# Patient Record
Sex: MALE | Race: WHITE
[De-identification: names, ages, dates, MRNs, and addresses within clinical notes are randomized per-mention and may not be internally consistent; named-entity substitution may affect disease eponyms.]

---

## 2022-09-15 PROBLEM — Z00.00 ENCOUNTER FOR PREVENTIVE HEALTH EXAMINATION: Status: ACTIVE | Noted: 2022-09-15

## 2022-09-30 ENCOUNTER — NON-APPOINTMENT (OUTPATIENT)
Age: 67
End: 2022-09-30

## 2022-09-30 ENCOUNTER — LABORATORY RESULT (OUTPATIENT)
Age: 67
End: 2022-09-30

## 2022-09-30 ENCOUNTER — APPOINTMENT (OUTPATIENT)
Age: 67
End: 2022-09-30

## 2022-09-30 VITALS
WEIGHT: 292 LBS | HEART RATE: 97 BPM | BODY MASS INDEX: 44.25 KG/M2 | OXYGEN SATURATION: 97 % | HEIGHT: 68 IN | RESPIRATION RATE: 14 BRPM

## 2022-09-30 DIAGNOSIS — Z86.39 PERSONAL HISTORY OF OTHER ENDOCRINE, NUTRITIONAL AND METABOLIC DISEASE: ICD-10-CM

## 2022-09-30 DIAGNOSIS — Z86.79 PERSONAL HISTORY OF OTHER DISEASES OF THE CIRCULATORY SYSTEM: ICD-10-CM

## 2022-09-30 DIAGNOSIS — Z87.891 PERSONAL HISTORY OF NICOTINE DEPENDENCE: ICD-10-CM

## 2022-09-30 PROCEDURE — 99204 OFFICE O/P NEW MOD 45 MIN: CPT

## 2022-09-30 RX ORDER — FLUTICASONE PROPIONATE 50 UG/1
50 SPRAY, METERED NASAL TWICE DAILY
Qty: 1 | Refills: 5 | Status: ACTIVE | COMMUNITY
Start: 2022-09-30 | End: 1900-01-01

## 2022-09-30 NOTE — DISCUSSION/SUMMARY
[FreeTextEntry1] : RECURRENT BRONCHITIS ( COUGH/ WHEEZING/ SOB), CATS ALLERGIES, OCC SMOKER\par ASTHMA\par RAST/ CBC\par PFT/ CHEST CT\par MONTELUKAST/ SYMBICORT\par \par PND FLUTICASONE\par \par HIGHLY ALE/ WEIGHT LOSS

## 2022-09-30 NOTE — HISTORY OF PRESENT ILLNESS
[Initial Eval - Existing Diagnosis] : an initial evaluation of an existing diagnosis of [Wheezing] : wheezing [Dyspnea on Exertion] : dyspnea on exertion [Productive Cough] : productive cough [Currently Experiencing] : The patient is currently experiencing symptoms. [None] : The patient is not currently being treated for this problem [Asthma Hospitalization] : no hospitalization for asthma [Intubation] : no intubation [Smoking] : smoking [Allergic Rhinitis] : allergic rhinitis [GERD] : gastroesophageal reflux [Asthma] : no asthma [Unlimited ADLs] : able to do activities of daily living without limitations [TextBox_4] : REPORTS ALMOST YEARLY BRONCHITIS, THIS MAY COUGHT, WHEEZING, SAW PMD STEROIDS/ ABX, BETTER FEW DAYS LATER SAME SYMPTOMS, WAS GIVEN SYMBICORT, USED TO BE ON SINUGULAIR STOPPED 2 Y AGO, SAW PUL BEFORE ASTHMA/ COPD\par GAINED 50 LB IN 12 YEARS, ALE SYMPTOMS\par REPORTS PND

## 2022-09-30 NOTE — PHYSICAL EXAM
[No Acute Distress] : no acute distress [IV] : Mallampati Class: IV [Normal Appearance] : normal appearance [No Neck Mass] : no neck mass [Normal Rate/Rhythm] : normal rate/rhythm [Normal S1, S2] : normal s1, s2 [No Murmurs] : no murmurs [No Abnormalities] : no abnormalities [Benign] : benign [Normal Gait] : normal gait [No Clubbing] : no clubbing [No Cyanosis] : no cyanosis [No Edema] : no edema [FROM] : FROM [Normal Color/ Pigmentation] : normal color/ pigmentation [No Focal Deficits] : no focal deficits [Oriented x3] : oriented x3 [Normal Affect] : normal affect [TextBox_68] : EXP WHEEZING

## 2022-10-12 RX ORDER — PREDNISONE 20 MG/1
20 TABLET ORAL
Qty: 15 | Refills: 0 | Status: ACTIVE | COMMUNITY
Start: 2022-10-12 | End: 1900-01-01

## 2022-10-12 RX ORDER — BUDESONIDE AND FORMOTEROL FUMARATE DIHYDRATE 160; 4.5 UG/1; UG/1
160-4.5 AEROSOL RESPIRATORY (INHALATION) TWICE DAILY
Qty: 1 | Refills: 3 | Status: ACTIVE | COMMUNITY
Start: 2022-10-12 | End: 1900-01-01

## 2022-11-01 ENCOUNTER — APPOINTMENT (OUTPATIENT)
Age: 67
End: 2022-11-01

## 2022-11-01 DIAGNOSIS — G47.33 OBSTRUCTIVE SLEEP APNEA (ADULT) (PEDIATRIC): ICD-10-CM

## 2022-11-01 DIAGNOSIS — J45.909 UNSPECIFIED ASTHMA, UNCOMPLICATED: ICD-10-CM

## 2022-11-01 DIAGNOSIS — R09.82 POSTNASAL DRIP: ICD-10-CM

## 2022-11-01 PROCEDURE — 99442: CPT | Mod: 95

## 2022-11-11 RX ORDER — DOXYCYCLINE HYCLATE 100 MG/1
100 CAPSULE ORAL TWICE DAILY
Qty: 14 | Refills: 0 | Status: ACTIVE | COMMUNITY
Start: 2022-11-11 | End: 1900-01-01

## 2022-11-30 ENCOUNTER — APPOINTMENT (OUTPATIENT)
Age: 67
End: 2022-11-30

## 2023-01-04 ENCOUNTER — APPOINTMENT (OUTPATIENT)
Age: 68
End: 2023-01-04

## 2023-01-05 ENCOUNTER — RX RENEWAL (OUTPATIENT)
Age: 68
End: 2023-01-05

## 2023-01-05 RX ORDER — MONTELUKAST 10 MG/1
10 TABLET, FILM COATED ORAL
Qty: 90 | Refills: 1 | Status: ACTIVE | COMMUNITY
Start: 2022-09-30 | End: 1900-01-01

## 2023-03-24 ENCOUNTER — INPATIENT (INPATIENT)
Facility: HOSPITAL | Age: 68
LOS: 2 days | Discharge: HOME | DRG: 313 | End: 2023-03-27
Attending: INTERNAL MEDICINE | Admitting: INTERNAL MEDICINE
Payer: MEDICARE

## 2023-03-24 VITALS
OXYGEN SATURATION: 91 % | DIASTOLIC BLOOD PRESSURE: 74 MMHG | RESPIRATION RATE: 18 BRPM | SYSTOLIC BLOOD PRESSURE: 136 MMHG | HEART RATE: 101 BPM | TEMPERATURE: 96 F

## 2023-03-24 DIAGNOSIS — R07.9 CHEST PAIN, UNSPECIFIED: ICD-10-CM

## 2023-03-24 LAB
ALBUMIN SERPL ELPH-MCNC: 4.5 G/DL — SIGNIFICANT CHANGE UP (ref 3.5–5.2)
ALP SERPL-CCNC: 100 U/L — SIGNIFICANT CHANGE UP (ref 30–115)
ALT FLD-CCNC: 24 U/L — SIGNIFICANT CHANGE UP (ref 0–41)
ANION GAP SERPL CALC-SCNC: 13 MMOL/L — SIGNIFICANT CHANGE UP (ref 7–14)
AST SERPL-CCNC: 34 U/L — SIGNIFICANT CHANGE UP (ref 0–41)
BASOPHILS # BLD AUTO: 0.04 K/UL — SIGNIFICANT CHANGE UP (ref 0–0.2)
BASOPHILS NFR BLD AUTO: 0.3 % — SIGNIFICANT CHANGE UP (ref 0–1)
BILIRUB SERPL-MCNC: 0.4 MG/DL — SIGNIFICANT CHANGE UP (ref 0.2–1.2)
BUN SERPL-MCNC: 16 MG/DL — SIGNIFICANT CHANGE UP (ref 10–20)
CALCIUM SERPL-MCNC: 9.5 MG/DL — SIGNIFICANT CHANGE UP (ref 8.4–10.4)
CHLORIDE SERPL-SCNC: 98 MMOL/L — SIGNIFICANT CHANGE UP (ref 98–110)
CO2 SERPL-SCNC: 28 MMOL/L — SIGNIFICANT CHANGE UP (ref 17–32)
CREAT SERPL-MCNC: 0.8 MG/DL — SIGNIFICANT CHANGE UP (ref 0.7–1.5)
D DIMER BLD IA.RAPID-MCNC: 473 NG/ML DDU — HIGH
EGFR: 96 ML/MIN/1.73M2 — SIGNIFICANT CHANGE UP
EOSINOPHIL # BLD AUTO: 0.07 K/UL — SIGNIFICANT CHANGE UP (ref 0–0.7)
EOSINOPHIL NFR BLD AUTO: 0.5 % — SIGNIFICANT CHANGE UP (ref 0–8)
GLUCOSE SERPL-MCNC: 118 MG/DL — HIGH (ref 70–99)
HCT VFR BLD CALC: 45.1 % — SIGNIFICANT CHANGE UP (ref 42–52)
HGB BLD-MCNC: 14.7 G/DL — SIGNIFICANT CHANGE UP (ref 14–18)
IMM GRANULOCYTES NFR BLD AUTO: 0.3 % — SIGNIFICANT CHANGE UP (ref 0.1–0.3)
LYMPHOCYTES # BLD AUTO: 0.94 K/UL — LOW (ref 1.2–3.4)
LYMPHOCYTES # BLD AUTO: 6.5 % — LOW (ref 20.5–51.1)
MCHC RBC-ENTMCNC: 32.3 PG — HIGH (ref 27–31)
MCHC RBC-ENTMCNC: 32.6 G/DL — SIGNIFICANT CHANGE UP (ref 32–37)
MCV RBC AUTO: 99.1 FL — HIGH (ref 80–94)
MONOCYTES # BLD AUTO: 0.9 K/UL — HIGH (ref 0.1–0.6)
MONOCYTES NFR BLD AUTO: 6.2 % — SIGNIFICANT CHANGE UP (ref 1.7–9.3)
NEUTROPHILS # BLD AUTO: 12.47 K/UL — HIGH (ref 1.4–6.5)
NEUTROPHILS NFR BLD AUTO: 86.2 % — HIGH (ref 42.2–75.2)
NRBC # BLD: 0 /100 WBCS — SIGNIFICANT CHANGE UP (ref 0–0)
NT-PROBNP SERPL-SCNC: 12 PG/ML — SIGNIFICANT CHANGE UP (ref 0–300)
PLATELET # BLD AUTO: 317 K/UL — SIGNIFICANT CHANGE UP (ref 130–400)
POTASSIUM SERPL-MCNC: 5 MMOL/L — SIGNIFICANT CHANGE UP (ref 3.5–5)
POTASSIUM SERPL-SCNC: 5 MMOL/L — SIGNIFICANT CHANGE UP (ref 3.5–5)
PROT SERPL-MCNC: 7 G/DL — SIGNIFICANT CHANGE UP (ref 6–8)
RBC # BLD: 4.55 M/UL — LOW (ref 4.7–6.1)
RBC # FLD: 13.1 % — SIGNIFICANT CHANGE UP (ref 11.5–14.5)
SODIUM SERPL-SCNC: 139 MMOL/L — SIGNIFICANT CHANGE UP (ref 135–146)
TROPONIN T SERPL-MCNC: <0.01 NG/ML — SIGNIFICANT CHANGE UP
WBC # BLD: 14.46 K/UL — HIGH (ref 4.8–10.8)
WBC # FLD AUTO: 14.46 K/UL — HIGH (ref 4.8–10.8)

## 2023-03-24 PROCEDURE — 87040 BLOOD CULTURE FOR BACTERIA: CPT

## 2023-03-24 PROCEDURE — 71045 X-RAY EXAM CHEST 1 VIEW: CPT | Mod: 26

## 2023-03-24 PROCEDURE — 86803 HEPATITIS C AB TEST: CPT

## 2023-03-24 PROCEDURE — 93010 ELECTROCARDIOGRAM REPORT: CPT | Mod: 76

## 2023-03-24 PROCEDURE — 36415 COLL VENOUS BLD VENIPUNCTURE: CPT

## 2023-03-24 PROCEDURE — 80048 BASIC METABOLIC PNL TOTAL CA: CPT

## 2023-03-24 PROCEDURE — 93005 ELECTROCARDIOGRAM TRACING: CPT

## 2023-03-24 PROCEDURE — 84443 ASSAY THYROID STIM HORMONE: CPT

## 2023-03-24 PROCEDURE — 94640 AIRWAY INHALATION TREATMENT: CPT

## 2023-03-24 PROCEDURE — 82962 GLUCOSE BLOOD TEST: CPT

## 2023-03-24 PROCEDURE — 78452 HT MUSCLE IMAGE SPECT MULT: CPT

## 2023-03-24 PROCEDURE — 83735 ASSAY OF MAGNESIUM: CPT

## 2023-03-24 PROCEDURE — 93306 TTE W/DOPPLER COMPLETE: CPT

## 2023-03-24 PROCEDURE — 84145 PROCALCITONIN (PCT): CPT

## 2023-03-24 PROCEDURE — 71275 CT ANGIOGRAPHY CHEST: CPT | Mod: 26,MA

## 2023-03-24 PROCEDURE — 83036 HEMOGLOBIN GLYCOSYLATED A1C: CPT

## 2023-03-24 PROCEDURE — 83615 LACTATE (LD) (LDH) ENZYME: CPT

## 2023-03-24 PROCEDURE — 85025 COMPLETE CBC W/AUTO DIFF WBC: CPT

## 2023-03-24 PROCEDURE — 93017 CV STRESS TEST TRACING ONLY: CPT

## 2023-03-24 PROCEDURE — 84484 ASSAY OF TROPONIN QUANT: CPT

## 2023-03-24 PROCEDURE — A9500: CPT

## 2023-03-24 PROCEDURE — 71045 X-RAY EXAM CHEST 1 VIEW: CPT

## 2023-03-24 PROCEDURE — 85027 COMPLETE CBC AUTOMATED: CPT

## 2023-03-24 PROCEDURE — 84100 ASSAY OF PHOSPHORUS: CPT

## 2023-03-24 PROCEDURE — 86140 C-REACTIVE PROTEIN: CPT

## 2023-03-24 PROCEDURE — 80053 COMPREHEN METABOLIC PANEL: CPT

## 2023-03-24 PROCEDURE — 85652 RBC SED RATE AUTOMATED: CPT

## 2023-03-24 PROCEDURE — 86618 LYME DISEASE ANTIBODY: CPT

## 2023-03-24 PROCEDURE — 80061 LIPID PANEL: CPT

## 2023-03-24 PROCEDURE — 99285 EMERGENCY DEPT VISIT HI MDM: CPT | Mod: FS

## 2023-03-24 RX ORDER — MORPHINE SULFATE 50 MG/1
4 CAPSULE, EXTENDED RELEASE ORAL ONCE
Refills: 0 | Status: DISCONTINUED | OUTPATIENT
Start: 2023-03-24 | End: 2023-03-24

## 2023-03-24 RX ADMIN — MORPHINE SULFATE 4 MILLIGRAM(S): 50 CAPSULE, EXTENDED RELEASE ORAL at 16:00

## 2023-03-24 NOTE — ED PROVIDER NOTE - ATTENDING APP SHARED VISIT CONTRIBUTION OF CARE
67-year-old male with a past medical history of jesus, asthma, bronchitis, hypertension presents with chest discomfort started this morning.  Middle of the chest.  Worse when he takes a deep breath.  Also associated shortness of breath and dyspnea on exertion over the past few days.  No fever or cough.  Denies new lower extremity swelling.  No history of DVT or PE.  On exam nontoxic, normal work of breathing, faint decreased breath sounds right base, no wheezes or crackles.  No rhonchi.  Sats 91% on room air, 97 on 4 L.  New oxygen requirement.  Heart regular no murmurs, abdomen benign, trace symmetric lower extremity pitting edema.  Differential diagnosis includes pneumonia, PE, heart failure, ACS.  Plan for chest x-ray, labs, D-dimer, troponin, supplemental oxygen, anticipate admission

## 2023-03-24 NOTE — ED PROVIDER NOTE - NS ED ATTENDING STATEMENT MOD
This was a shared visit with the CHERRI. I reviewed and verified the documentation and independently performed the documented:

## 2023-03-24 NOTE — ED ADULT NURSE NOTE - NSIMPLEMENTINTERV_GEN_ALL_ED
Implemented All Universal Safety Interventions:  [place] to call system. Call bell, personal items and telephone within reach. Instruct patient to call for assistance. Room bathroom lighting operational. Non-slip footwear when patient is off stretcher. Physically safe environment: no spills, clutter or unnecessary equipment. Stretcher in lowest position, wheels locked, appropriate side rails in place.

## 2023-03-24 NOTE — ED PROVIDER NOTE - PHYSICAL EXAMINATION
Physical Exam    Constitutional: No acute distress.   Eyes: Conjunctiva pink, Sclera clear, PERRLA, EOMI.  ENT: No sinus tenderness. No nasal discharge. No oropharyngeal erythema, edema, or exudates. Uvula midline.   Cardiovascular: Regular rate, regular rhythm. No noted murmurs rubs or gallops.  Respiratory: unlabored respiratory effort, decreased breath sounds right lower lobe   Gastrointestinal: Normal bowel sounds. soft, non distended, non-tender abdomen.   Musculoskeletal: supple neck, no midline tenderness. No joint or bony deformity.   Integumentary: warm, dry, no rash  Neurologic: awake, alert, cranial nerves II-XII grossly intact, extremities’ motor and sensory functions grossly intact  Psychiatric: appropriate mood, appropriate affect

## 2023-03-24 NOTE — ED PROVIDER NOTE - INTERPRETATION
Normal sinus rhythm, rate 100, normal intervals, normal axis, nonspecific T wave abnormalities, no ST depression or elevations, occasional PACs

## 2023-03-24 NOTE — ED PROVIDER NOTE - PROGRESS NOTE DETAILS
EP: Patient endorsed  to me by Dr. Cornelius to follow-up CTA chest without PE and admit.  CTA is negative for acute PE.

## 2023-03-24 NOTE — ED PROVIDER NOTE - OBJECTIVE STATEMENT
67-year-old male with past medical history of asthma, chronic bronchitis, ALE on CPAP, HTN, remote history of pericarditis presents the ED with chest pain and shortness of breath that started suddenly this morning at 8 AM.  Patient was initially experiencing right-sided chest pain but now describes substernal chest pain that is squeezing and constant.  He was given nitroglycerin x2 by EMS and took 4 baby aspirin's at home but remains with the pain.  Shortness of breath is present at rest and on exertion, O2 saturation on presentation was 91% room air.  Denies fever, chills, cough, abdominal pain, nausea, vomiting, diarrhea, dysuria, leg swelling.  No history of blood clots, no recent surgeries, no recent travel, no hormone use.

## 2023-03-24 NOTE — ED PROVIDER NOTE - CARE PLAN
1 Principal Discharge DX:	Chest pain  Secondary Diagnosis:	Shortness of breath  Secondary Diagnosis:	Hypoxia

## 2023-03-24 NOTE — ED ADULT NURSE NOTE - OBJECTIVE STATEMENT
pt A&Ox3, pt presents with persistent chest pain started this morning.   pt breathing with ease in 4l nasal canula.
Detail Level: Detailed

## 2023-03-24 NOTE — ED PROVIDER NOTE - CLINICAL SUMMARY MEDICAL DECISION MAKING FREE TEXT BOX
67-year-old male with ALE, hypertension, asthma presenting to ED for evaluation of chest pain and shortness of breath since this morning.  Labs ordered and reviewed, patient was found to have an elevated white cell count, CTA chest ordered to rule out PE, no PE found.  Given increased requirement for oxygen, will admit to the hospital for further evaluation and care.

## 2023-03-25 DIAGNOSIS — Z96.652 PRESENCE OF LEFT ARTIFICIAL KNEE JOINT: Chronic | ICD-10-CM

## 2023-03-25 DIAGNOSIS — Z98.49 CATARACT EXTRACTION STATUS, UNSPECIFIED EYE: Chronic | ICD-10-CM

## 2023-03-25 LAB
ANION GAP SERPL CALC-SCNC: 11 MMOL/L — SIGNIFICANT CHANGE UP (ref 7–14)
BUN SERPL-MCNC: 16 MG/DL — SIGNIFICANT CHANGE UP (ref 10–20)
CALCIUM SERPL-MCNC: 9.5 MG/DL — SIGNIFICANT CHANGE UP (ref 8.4–10.5)
CHLORIDE SERPL-SCNC: 95 MMOL/L — LOW (ref 98–110)
CO2 SERPL-SCNC: 31 MMOL/L — SIGNIFICANT CHANGE UP (ref 17–32)
CREAT SERPL-MCNC: 0.9 MG/DL — SIGNIFICANT CHANGE UP (ref 0.7–1.5)
EGFR: 93 ML/MIN/1.73M2 — SIGNIFICANT CHANGE UP
GLUCOSE BLDC GLUCOMTR-MCNC: 101 MG/DL — HIGH (ref 70–99)
GLUCOSE BLDC GLUCOMTR-MCNC: 119 MG/DL — HIGH (ref 70–99)
GLUCOSE SERPL-MCNC: 108 MG/DL — HIGH (ref 70–99)
HCT VFR BLD CALC: 42.1 % — SIGNIFICANT CHANGE UP (ref 42–52)
HGB BLD-MCNC: 13.6 G/DL — LOW (ref 14–18)
MAGNESIUM SERPL-MCNC: 2.1 MG/DL — SIGNIFICANT CHANGE UP (ref 1.8–2.4)
MCHC RBC-ENTMCNC: 31.9 PG — HIGH (ref 27–31)
MCHC RBC-ENTMCNC: 32.3 G/DL — SIGNIFICANT CHANGE UP (ref 32–37)
MCV RBC AUTO: 98.8 FL — HIGH (ref 80–94)
NRBC # BLD: 0 /100 WBCS — SIGNIFICANT CHANGE UP (ref 0–0)
PLATELET # BLD AUTO: 282 K/UL — SIGNIFICANT CHANGE UP (ref 130–400)
POTASSIUM SERPL-MCNC: 4.5 MMOL/L — SIGNIFICANT CHANGE UP (ref 3.5–5)
POTASSIUM SERPL-SCNC: 4.5 MMOL/L — SIGNIFICANT CHANGE UP (ref 3.5–5)
RBC # BLD: 4.26 M/UL — LOW (ref 4.7–6.1)
RBC # FLD: 13.4 % — SIGNIFICANT CHANGE UP (ref 11.5–14.5)
SARS-COV-2 RNA SPEC QL NAA+PROBE: SIGNIFICANT CHANGE UP
SODIUM SERPL-SCNC: 137 MMOL/L — SIGNIFICANT CHANGE UP (ref 135–146)
TROPONIN T SERPL-MCNC: <0.01 NG/ML — SIGNIFICANT CHANGE UP
TROPONIN T SERPL-MCNC: <0.01 NG/ML — SIGNIFICANT CHANGE UP
WBC # BLD: 9.34 K/UL — SIGNIFICANT CHANGE UP (ref 4.8–10.8)
WBC # FLD AUTO: 9.34 K/UL — SIGNIFICANT CHANGE UP (ref 4.8–10.8)

## 2023-03-25 PROCEDURE — 93010 ELECTROCARDIOGRAM REPORT: CPT

## 2023-03-25 PROCEDURE — 99223 1ST HOSP IP/OBS HIGH 75: CPT

## 2023-03-25 RX ORDER — SIMVASTATIN 20 MG/1
20 TABLET, FILM COATED ORAL AT BEDTIME
Refills: 0 | Status: DISCONTINUED | OUTPATIENT
Start: 2023-03-25 | End: 2023-03-27

## 2023-03-25 RX ORDER — LANOLIN ALCOHOL/MO/W.PET/CERES
3 CREAM (GRAM) TOPICAL AT BEDTIME
Refills: 0 | Status: DISCONTINUED | OUTPATIENT
Start: 2023-03-25 | End: 2023-03-27

## 2023-03-25 RX ORDER — ACETAMINOPHEN 500 MG
650 TABLET ORAL EVERY 6 HOURS
Refills: 0 | Status: DISCONTINUED | OUTPATIENT
Start: 2023-03-25 | End: 2023-03-27

## 2023-03-25 RX ORDER — REGADENOSON 0.08 MG/ML
0.4 INJECTION, SOLUTION INTRAVENOUS ONCE
Refills: 0 | Status: COMPLETED | OUTPATIENT
Start: 2023-03-25 | End: 2023-03-26

## 2023-03-25 RX ORDER — PANTOPRAZOLE SODIUM 20 MG/1
40 TABLET, DELAYED RELEASE ORAL
Refills: 0 | Status: DISCONTINUED | OUTPATIENT
Start: 2023-03-25 | End: 2023-03-27

## 2023-03-25 RX ORDER — COLCHICINE 0.6 MG
0.6 TABLET ORAL
Refills: 0 | Status: DISCONTINUED | OUTPATIENT
Start: 2023-03-25 | End: 2023-03-27

## 2023-03-25 RX ORDER — GLYCOPYRROLATE AND FORMOTEROL FUMARATE 9; 4.8 UG/1; UG/1
2 AEROSOL, METERED RESPIRATORY (INHALATION)
Refills: 0 | Status: DISCONTINUED | OUTPATIENT
Start: 2023-03-25 | End: 2023-03-26

## 2023-03-25 RX ORDER — BUDESONIDE AND FORMOTEROL FUMARATE DIHYDRATE 160; 4.5 UG/1; UG/1
2 AEROSOL RESPIRATORY (INHALATION)
Refills: 0 | Status: DISCONTINUED | OUTPATIENT
Start: 2023-03-25 | End: 2023-03-27

## 2023-03-25 RX ORDER — CEFTRIAXONE 500 MG/1
1000 INJECTION, POWDER, FOR SOLUTION INTRAMUSCULAR; INTRAVENOUS EVERY 24 HOURS
Refills: 0 | Status: DISCONTINUED | OUTPATIENT
Start: 2023-03-26 | End: 2023-03-27

## 2023-03-25 RX ORDER — ENOXAPARIN SODIUM 100 MG/ML
40 INJECTION SUBCUTANEOUS EVERY 24 HOURS
Refills: 0 | Status: DISCONTINUED | OUTPATIENT
Start: 2023-03-25 | End: 2023-03-27

## 2023-03-25 RX ORDER — MONTELUKAST 4 MG/1
10 TABLET, CHEWABLE ORAL DAILY
Refills: 0 | Status: DISCONTINUED | OUTPATIENT
Start: 2023-03-25 | End: 2023-03-27

## 2023-03-25 RX ORDER — ASPIRIN/CALCIUM CARB/MAGNESIUM 324 MG
975 TABLET ORAL THREE TIMES A DAY
Refills: 0 | Status: DISCONTINUED | OUTPATIENT
Start: 2023-03-25 | End: 2023-03-27

## 2023-03-25 RX ORDER — CHLORHEXIDINE GLUCONATE 213 G/1000ML
1 SOLUTION TOPICAL
Refills: 0 | Status: DISCONTINUED | OUTPATIENT
Start: 2023-03-25 | End: 2023-03-27

## 2023-03-25 RX ORDER — IBUPROFEN 200 MG
600 TABLET ORAL EVERY 8 HOURS
Refills: 0 | Status: DISCONTINUED | OUTPATIENT
Start: 2023-03-25 | End: 2023-03-25

## 2023-03-25 RX ORDER — ONDANSETRON 8 MG/1
4 TABLET, FILM COATED ORAL EVERY 8 HOURS
Refills: 0 | Status: DISCONTINUED | OUTPATIENT
Start: 2023-03-25 | End: 2023-03-27

## 2023-03-25 RX ORDER — ALBUTEROL 90 UG/1
2 AEROSOL, METERED ORAL EVERY 6 HOURS
Refills: 0 | Status: DISCONTINUED | OUTPATIENT
Start: 2023-03-25 | End: 2023-03-27

## 2023-03-25 RX ADMIN — Medication 0.6 MILLIGRAM(S): at 20:57

## 2023-03-25 RX ADMIN — SIMVASTATIN 20 MILLIGRAM(S): 20 TABLET, FILM COATED ORAL at 20:57

## 2023-03-25 RX ADMIN — Medication 0.6 MILLIGRAM(S): at 14:53

## 2023-03-25 RX ADMIN — Medication 975 MILLIGRAM(S): at 14:53

## 2023-03-25 RX ADMIN — Medication 3 MILLIGRAM(S): at 20:57

## 2023-03-25 RX ADMIN — Medication 975 MILLIGRAM(S): at 20:57

## 2023-03-25 RX ADMIN — MONTELUKAST 10 MILLIGRAM(S): 4 TABLET, CHEWABLE ORAL at 14:53

## 2023-03-25 NOTE — H&P ADULT - NSHPPHYSICALEXAM_GEN_ALL_CORE
Constitutional: No acute distress.   Eyes: Conjunctiva pink, Sclera clear, PERRLA, EOMI.  ENT: No sinus tenderness. No nasal discharge. No oropharyngeal erythema, edema, or exudates. Uvula midline.   Cardiovascular: Regular rate, regular rhythm. No noted murmurs rubs or gallops.  Respiratory: unlabored respiratory effort, decreased breath sounds right lower lobe   Gastrointestinal: Normal bowel sounds. soft, non distended, non-tender abdomen.   Musculoskeletal: supple neck, no midline tenderness. No joint or bony deformity.   Integumentary: warm, dry, no rash  Neurologic: awake, alert, cranial nerves II-XII grossly intact, extremities’ motor and sensory functions grossly intact  Psychiatric: appropriate mood, appropriate affect

## 2023-03-25 NOTE — H&P ADULT - ATTENDING COMMENTS
67M PMH:  ALE on cpap qhs, Asthma, Bronchitis, HTN presents with Midsternal CP associated with AKERS. Pain has been present for a few days intermittently. Pt states works out with weights and had CP 30yrs ago where Stress test was abnormal and Angiogram revealed some lesion? for which the cardiologist opted out to treat with stents. Pt quite smocking a couple of months ago.   Denies: Palpitations, dizziness or LOC     Vital Signs Last 24 Hrs  T(C): 36.3 (24 Mar 2023 15:40), Max: 36.3 (24 Mar 2023 15:40)  T(F): 97.3 (24 Mar 2023 15:40), Max: 97.3 (24 Mar 2023 15:40)  HR: 101 (24 Mar 2023 15:40) (101 - 101)  BP: 173/81 (24 Mar 2023 15:40) (173/81 - 173/81)  RR: 18 (24 Mar 2023 15:40) (18 - 18)  SpO2: 98% (24 Mar 2023 15:40) (98% - 98%)    Parameters below as of 24 Mar 2023 15:40    GEN: NAD  CHEST: No tenderness on palpation   CV: NL S1/2  RESP: CTA B/L  GI: +BS SOFT NT ND  Ext: No e/c/c   MS: AOx3    CXR:   Impression:  Right basilar opacity, elevation of the right hemidiaphragm    CT - Chest:   No CTA evidence of acute central pulmonary embolus.  Right lower lobe atelectasis.    Diagnosis Line Sinus tachycardia  Nonspecific T wave abnormality  Abnormal ECG    Diagnosis Line Sinus rhythm with Premature supraventricular complexes  Nonspecific T wave abnormality  Abnormal ECG    EKG: NSR no acute findings                         13.6   9.34  )-----------( 282      ( 25 Mar 2023 11:04 )             42.1     03-25    137  |  95<L>  |  16  ----------------------------<  108<H>  4.5   |  31  |  0.9    Ca    9.5      25 Mar 2023 11:04    Mg     2.1     03-25    TPro  7.0  /  Alb  4.5  /  TBili  0.4  /  DBili  x   /  AST  34  /  ALT  24  /  AlkPhos  100  03-24     IMPRESSION:  CHEST PAIN AND AKERS  RULE OUT CAD  EX-SMOKER A FEW MONTHS AGO   RECENTLY DX ASTHMA NOT IN [facility] PER EXAM AND HISTORY   RULED OUT for: PNA/PE   H/O HTN     PLAN:   Keep on telemetry  Trend trops (x2 neg at this time)   Check: TSH, Lipid Profile, HA1C  Check: STRESS ECHO ALEJANDRO SCAN R/O CAD if ++ get CV consultation   ASA 81mg po daily   Atovastatin 80mg po qhs (d/c if lipids NL or if Alejandro Neg)   Resume Home meds: Med rec done at bedside and meds ordered     Dispo: acute / complete above w/up / d/w pt and his wife A/P and they are on board. All Q answered.

## 2023-03-25 NOTE — CHART NOTE - NSCHARTNOTEFT_GEN_A_CORE
Spoke w cardiology Dr Arenas     - Stress test in am, NPO after midnight   - Echo pending  - Repeat trop 8pm

## 2023-03-25 NOTE — H&P ADULT - NSICDXPASTMEDICALHX_GEN_ALL_CORE_FT
PAST MEDICAL HISTORY:  Asthmatic bronchitis , chronic     HTN, goal below 140/80     ALE (obstructive sleep apnea)

## 2023-03-25 NOTE — H&P ADULT - MLM HIDDEN
If you were prescribed any medication take as directed. Follow up with your primary care physician or with specialist as directed. Return to the emergency room with any new or worsening conditions.
yes

## 2023-03-25 NOTE — H&P ADULT - NSICDXPASTSURGICALHX_GEN_ALL_CORE_FT
PAST SURGICAL HISTORY:  H/O total knee replacement, left     H/O total knee replacement, left     S/P cataract surgery

## 2023-03-25 NOTE — H&P ADULT - ASSESSMENT
HPI: 67 M PMHx of ALE, Asthma, [id number], ALE, HTN presents with chest discomfort that started 8am on 03/24/23 and was stable in intensity. Pain localized to middle of the chest, non-radiating, 6/10 in intensity, worse with deep inspiration and when laying flat. Associated with SOB and AKERS over past few days. Reminds him of pericarditis pain he had 30 years ago. No fever, cough, chills, NVD, Abdominal pain, Dysuria, or LE swelling. No Hx of PE/DVT.     In the ER:   Vitals: /74, , RR 18, T 35.6 oral, SpO2 91% RA  EKG: NSR  Sig Labs: WBC 14.46, Hgb 14.7, , Cr 0.8, Trop <0.01, BNP 12, COVID -ve  Imaging:   CXR: Right basilar opacity, elevation of the right hemidiaphragm  CTA: No CTA evidence of acute central pulmonary embolus. Right lower lobe atelectasis.      # Acute Pericarditis   # RLL PNA   - Patient clinically and hemodynamically stable on admission   - Saturating well on RA, Mild Tachycardia   - Chest discomfort compatible with pericarditis   - WBC 14.46, , Trop <0.01 (trend), BNP 12, COVID -ve, Send RVP  - ESR, CRP, LDH sent  - Procal sent, Sputum Cx, BCx, Urine Strep + Legionella   - Cardiology Consulted   - TTE ordered  - Aspirin 0.975g TID and Colchicine 0.6 BID   - Pantoprazole 40 for GI protection   - Would Treat for RLL PNA, Started Ceftriaxone 1g QD (if procal and cultures -ve DC)   HPI: 67 M PMHx of ALE, Asthma, [id number], ALE, HTN presents with chest discomfort that started 8am on 03/24/23 and was stable in intensity. Pain localized to middle of the chest, non-radiating, 6/10 in intensity, worse with deep inspiration and when laying flat. Associated with SOB and AKERS over past few days. Reminds him of pericarditis pain he had 30 years ago. No fever, cough, chills, NVD, Abdominal pain, Dysuria, or LE swelling. No Hx of PE/DVT.     In the ER:   Vitals: /74, , RR 18, T 35.6 oral, SpO2 91% RA  EKG: NSR  Sig Labs: WBC 14.46, Hgb 14.7, , Cr 0.8, Trop <0.01, BNP 12, COVID -ve  Imaging:   CXR: Right basilar opacity, elevation of the right hemidiaphragm  CTA: No CTA evidence of acute central pulmonary embolus. Right lower lobe atelectasis.      # Acute Pericarditis   # RLL PNA   - Patient clinically and hemodynamically stable on admission   - Saturating well on RA, Mild Tachycardia   - Chest discomfort compatible with pericarditis   - WBC 14.46, , Trop <0.01 (trend), BNP 12, COVID -ve, Send RVP  - Tele monitoring, Admit to tele   - ESR, CRP, LDH sent  - Procal sent, Sputum Cx, BCx, Urine Strep + Legionella, Lyme serology   - Cardiology Consulted   - TTE ordered, Stress test ordered   - Aspirin 0.975g TID and Colchicine 0.6 BID   - Pantoprazole 40 for GI protection   - Would Treat for RLL PNA, Started Ceftriaxone 1g QD (if procal and cultures -ve DC)  - Risk stratification A1C, Lipid Profile, TSH in AM     # ALE  - Home CPAP Machine     # HTN  - Home trendalapril converted to enalapril 20 QD    # Asthma/Allergies   - C/w home inhalers (converted to local availability)     # DLD  - Dash Diet  - C/w home simvastatin 20     DVT PPX: Lovenox  GI PPX: Pantoprazole   Diet: DASH  Activity: IAT  Code: Full  Dispo: Acute from home  HPI: 67 M PMHx of ALE, Asthma, [id number], ALE, HTN presents with chest discomfort that started 8am on 03/24/23 and was stable in intensity. Pain localized to middle of the chest, non-radiating, 6/10 in intensity, worse with deep inspiration and when laying flat. Associated with SOB and AKERS over past few days. Reminds him of pericarditis pain he had 30 years ago. No fever, cough, chills, NVD, Abdominal pain, Dysuria, or LE swelling. No Hx of PE/DVT.     In the ER:   Vitals: /74, , RR 18, T 35.6 oral, SpO2 91% RA  EKG: NSR  Sig Labs: WBC 14.46, Hgb 14.7, , Cr 0.8, Trop <0.01, BNP 12, COVID -ve    Imaging:   CXR: Right basilar opacity, elevation of the right hemidiaphragm  CTA: No CTA evidence of acute central pulmonary embolus. Right lower lobe atelectasis.      # Acute Pericarditis   # RLL PNA   - Patient clinically and hemodynamically stable on admission   - Saturating well on RA, Mild Tachycardia   - Chest discomfort compatible with pericarditis   - WBC 14.46, , Trop <0.01 (trend), BNP 12, COVID -ve, Send RVP  - Tele monitoring, Admit to tele   - ESR, CRP, LDH sent  - Procal sent, Sputum Cx, BCx, Urine Strep + Legionella, Lyme serology   - Cardiology Consulted   - TTE ordered, Stress test ordered   - Aspirin 0.975g TID and Colchicine 0.6 BID   - Pantoprazole 40 for GI protection   - Would Treat for RLL PNA, Started Ceftriaxone 1g QD (if procal and cultures -ve DC)  - Risk stratification A1C, Lipid Profile, TSH in AM     # ALE  - Home CPAP Machine     # HTN  - Home trendalapril converted to enalapril 20 QD    # Asthma/Allergies   - C/w home inhalers (converted to local availability)     # DLD  - Dash Diet  - C/w home simvastatin 20     DVT PPX: Lovenox  GI PPX: Pantoprazole   Diet: DASH  Activity: IAT  Code: Full  Dispo: Acute from home

## 2023-03-25 NOTE — H&P ADULT - HISTORY OF PRESENT ILLNESS
HPI: 67 M PMHx of ALE, Asthma, [id number], ALE, HTN presents with chest discomfort that started 8am on 03/24/23 and was stable in intensity. Pain localized to middle of the chest, non-radiating, 6/10 in intensity, worse with deep inspiration and when laying flat. Associated with SOB and AKERS over past few days. Reminds him of pericarditis pain he had 30 years ago. No fever, cough, chills, NVD, Abdominal pain, Dysuria, or LE swelling. No Hx of PE/DVT.     In the ER:   Vitals: /74, , RR 18, T 35.6 oral, SpO2 91% RA  EKG: NSR  Sig Labs: WBC 14.46, Hgb 14.7, , Cr 0.8, Trop <0.01, BNP 12, COVID -ve  Imaging:   CXR: Right basilar opacity, elevation of the right hemidiaphragm  CTA: No CTA evidence of acute central pulmonary embolus. Right lower lobe atelectasis.    Interventions in the ER:    HPI: 67 M PMHx of ALE, Asthma, [id number], ALE, HTN presents with chest discomfort that started 8am on 03/24/23 and was stable in intensity. Pain localized to middle of the chest, non-radiating, 6/10 in intensity, worse with deep inspiration and when laying flat. Associated with SOB and AKERS over past few days. Reminds him of pericarditis pain he had 30 years ago. No fever, cough, chills, NVD, Abdominal pain, Dysuria, or LE swelling. No Hx of PE/DVT.     In the ER:   Vitals: /74, , RR 18, T 35.6 oral, SpO2 91% RA  EKG: NSR  Sig Labs: WBC 14.46, Hgb 14.7, , Cr 0.8, Trop <0.01, BNP 12, COVID -ve  Imaging:   CXR: Right basilar opacity, elevation of the right hemidiaphragm  CTA: No CTA evidence of acute central pulmonary embolus. Right lower lobe atelectasis.

## 2023-03-26 LAB
ALBUMIN SERPL ELPH-MCNC: 4.1 G/DL — SIGNIFICANT CHANGE UP (ref 3.5–5.2)
ALP SERPL-CCNC: 86 U/L — SIGNIFICANT CHANGE UP (ref 30–115)
ALT FLD-CCNC: 18 U/L — SIGNIFICANT CHANGE UP (ref 0–41)
ANION GAP SERPL CALC-SCNC: 12 MMOL/L — SIGNIFICANT CHANGE UP (ref 7–14)
AST SERPL-CCNC: 16 U/L — SIGNIFICANT CHANGE UP (ref 0–41)
BASOPHILS # BLD AUTO: 0.01 K/UL — SIGNIFICANT CHANGE UP (ref 0–0.2)
BASOPHILS NFR BLD AUTO: 0.1 % — SIGNIFICANT CHANGE UP (ref 0–1)
BILIRUB SERPL-MCNC: 0.4 MG/DL — SIGNIFICANT CHANGE UP (ref 0.2–1.2)
BUN SERPL-MCNC: 17 MG/DL — SIGNIFICANT CHANGE UP (ref 10–20)
CALCIUM SERPL-MCNC: 9.5 MG/DL — SIGNIFICANT CHANGE UP (ref 8.4–10.4)
CHLORIDE SERPL-SCNC: 98 MMOL/L — SIGNIFICANT CHANGE UP (ref 98–110)
CHOLEST SERPL-MCNC: 152 MG/DL — SIGNIFICANT CHANGE UP
CO2 SERPL-SCNC: 30 MMOL/L — SIGNIFICANT CHANGE UP (ref 17–32)
CREAT SERPL-MCNC: 0.9 MG/DL — SIGNIFICANT CHANGE UP (ref 0.7–1.5)
CRP SERPL-MCNC: 108.6 MG/L — HIGH
EGFR: 93 ML/MIN/1.73M2 — SIGNIFICANT CHANGE UP
EOSINOPHIL # BLD AUTO: 0.17 K/UL — SIGNIFICANT CHANGE UP (ref 0–0.7)
EOSINOPHIL NFR BLD AUTO: 2 % — SIGNIFICANT CHANGE UP (ref 0–8)
ERYTHROCYTE [SEDIMENTATION RATE] IN BLOOD: 42 MM/HR — HIGH (ref 0–10)
GLUCOSE SERPL-MCNC: 120 MG/DL — HIGH (ref 70–99)
HCT VFR BLD CALC: 41.6 % — LOW (ref 42–52)
HDLC SERPL-MCNC: 78 MG/DL — SIGNIFICANT CHANGE UP
HGB BLD-MCNC: 13.5 G/DL — LOW (ref 14–18)
IMM GRANULOCYTES NFR BLD AUTO: 0.2 % — SIGNIFICANT CHANGE UP (ref 0.1–0.3)
LDH SERPL L TO P-CCNC: 133 U/L — SIGNIFICANT CHANGE UP (ref 50–242)
LIPID PNL WITH DIRECT LDL SERPL: 61 MG/DL — SIGNIFICANT CHANGE UP
LYMPHOCYTES # BLD AUTO: 1.2 K/UL — SIGNIFICANT CHANGE UP (ref 1.2–3.4)
LYMPHOCYTES # BLD AUTO: 13.8 % — LOW (ref 20.5–51.1)
MAGNESIUM SERPL-MCNC: 2 MG/DL — SIGNIFICANT CHANGE UP (ref 1.8–2.4)
MCHC RBC-ENTMCNC: 32.1 PG — HIGH (ref 27–31)
MCHC RBC-ENTMCNC: 32.5 G/DL — SIGNIFICANT CHANGE UP (ref 32–37)
MCV RBC AUTO: 99 FL — HIGH (ref 80–94)
MONOCYTES # BLD AUTO: 0.76 K/UL — HIGH (ref 0.1–0.6)
MONOCYTES NFR BLD AUTO: 8.8 % — SIGNIFICANT CHANGE UP (ref 1.7–9.3)
NEUTROPHILS # BLD AUTO: 6.52 K/UL — HIGH (ref 1.4–6.5)
NEUTROPHILS NFR BLD AUTO: 75.1 % — SIGNIFICANT CHANGE UP (ref 42.2–75.2)
NON HDL CHOLESTEROL: 74 MG/DL — SIGNIFICANT CHANGE UP
NRBC # BLD: 0 /100 WBCS — SIGNIFICANT CHANGE UP (ref 0–0)
PHOSPHATE SERPL-MCNC: 4 MG/DL — SIGNIFICANT CHANGE UP (ref 2.1–4.9)
PLATELET # BLD AUTO: 273 K/UL — SIGNIFICANT CHANGE UP (ref 130–400)
POTASSIUM SERPL-MCNC: 4.2 MMOL/L — SIGNIFICANT CHANGE UP (ref 3.5–5)
POTASSIUM SERPL-SCNC: 4.2 MMOL/L — SIGNIFICANT CHANGE UP (ref 3.5–5)
PROT SERPL-MCNC: 6.3 G/DL — SIGNIFICANT CHANGE UP (ref 6–8)
RBC # BLD: 4.2 M/UL — LOW (ref 4.7–6.1)
RBC # FLD: 13.3 % — SIGNIFICANT CHANGE UP (ref 11.5–14.5)
SODIUM SERPL-SCNC: 140 MMOL/L — SIGNIFICANT CHANGE UP (ref 135–146)
TRIGL SERPL-MCNC: 61 MG/DL — SIGNIFICANT CHANGE UP
TROPONIN T SERPL-MCNC: <0.01 NG/ML — SIGNIFICANT CHANGE UP
WBC # BLD: 8.68 K/UL — SIGNIFICANT CHANGE UP (ref 4.8–10.8)
WBC # FLD AUTO: 8.68 K/UL — SIGNIFICANT CHANGE UP (ref 4.8–10.8)

## 2023-03-26 PROCEDURE — 93018 CV STRESS TEST I&R ONLY: CPT

## 2023-03-26 PROCEDURE — 99232 SBSQ HOSP IP/OBS MODERATE 35: CPT

## 2023-03-26 PROCEDURE — 78452 HT MUSCLE IMAGE SPECT MULT: CPT | Mod: 26

## 2023-03-26 PROCEDURE — 99223 1ST HOSP IP/OBS HIGH 75: CPT | Mod: 25

## 2023-03-26 PROCEDURE — 93306 TTE W/DOPPLER COMPLETE: CPT | Mod: 26

## 2023-03-26 PROCEDURE — 93016 CV STRESS TEST SUPVJ ONLY: CPT

## 2023-03-26 RX ADMIN — Medication 975 MILLIGRAM(S): at 22:09

## 2023-03-26 RX ADMIN — BUDESONIDE AND FORMOTEROL FUMARATE DIHYDRATE 2 PUFF(S): 160; 4.5 AEROSOL RESPIRATORY (INHALATION) at 22:09

## 2023-03-26 RX ADMIN — Medication 975 MILLIGRAM(S): at 14:20

## 2023-03-26 RX ADMIN — Medication 20 MILLIGRAM(S): at 06:31

## 2023-03-26 RX ADMIN — BUDESONIDE AND FORMOTEROL FUMARATE DIHYDRATE 2 PUFF(S): 160; 4.5 AEROSOL RESPIRATORY (INHALATION) at 08:06

## 2023-03-26 RX ADMIN — MONTELUKAST 10 MILLIGRAM(S): 4 TABLET, CHEWABLE ORAL at 12:12

## 2023-03-26 RX ADMIN — Medication 0.6 MILLIGRAM(S): at 06:31

## 2023-03-26 RX ADMIN — SIMVASTATIN 20 MILLIGRAM(S): 20 TABLET, FILM COATED ORAL at 22:09

## 2023-03-26 RX ADMIN — Medication 975 MILLIGRAM(S): at 06:31

## 2023-03-26 RX ADMIN — Medication 975 MILLIGRAM(S): at 01:30

## 2023-03-26 RX ADMIN — REGADENOSON 0.4 MILLIGRAM(S): 0.08 INJECTION, SOLUTION INTRAVENOUS at 10:27

## 2023-03-26 RX ADMIN — CEFTRIAXONE 100 MILLIGRAM(S): 500 INJECTION, POWDER, FOR SOLUTION INTRAMUSCULAR; INTRAVENOUS at 06:33

## 2023-03-26 RX ADMIN — Medication 0.6 MILLIGRAM(S): at 17:23

## 2023-03-26 RX ADMIN — PANTOPRAZOLE SODIUM 40 MILLIGRAM(S): 20 TABLET, DELAYED RELEASE ORAL at 06:31

## 2023-03-26 NOTE — CONSULT NOTE ADULT - ATTENDING COMMENTS
Patient seen / examined and plan amended above    Acute Pericarditis    Chest pain significantly improved    TTE - EF >70%, no valvular heart disease  Lexiscan MPI - Negative  ESR  42      - Continue current therapy with aspirin and colchicine  - Outpatient follow up with primary cardiologist in [place]

## 2023-03-26 NOTE — CONSULT NOTE ADULT - ASSESSMENT
ASSESSMENT:  # Chest pain - pleuritic, GA depressions on ECG, elevated ESR and CRP, troponin <0.01 x4, PE ruled out - suggestive of pericarditis  # Recurrent bronchitis, ALE     RECOMMENDATIONS  - Check 2D echo  - Continue NSAIDS and colchicine  - F/U Lexiscan nuclear stress test  - Rest of care as per primary team ASSESSMENT:  # Chest pain - pleuritic, NH depressions on ECG, elevated ESR and CRP, troponin <0.01 x4, PE ruled out - suggestive of pericarditis  # Recurrent bronchitis  # ALE       RECOMMENDATIONS:  - Check 2D Echo  - Continue NSAIDS and colchicine  - F/U Lexiscan nuclear stress test  - Rest of care as per primary team

## 2023-03-26 NOTE — PATIENT PROFILE ADULT - FALL HARM RISK - HARM RISK INTERVENTIONS

## 2023-03-26 NOTE — PATIENT PROFILE ADULT - STATED REASON FOR ADMISSION
Pt c/o chest pain that started this morning. EMS given one spray ntg SL, pain persists.  chest pain

## 2023-03-26 NOTE — CONSULT NOTE ADULT - SUBJECTIVE AND OBJECTIVE BOX
HPI:  HPI: 67 M PMHx of ALE, Asthma, [id number], ALE, HTN presents with chest discomfort that started 8am on 03/24/23 and was stable in intensity. Pain localized to middle of the chest, non-radiating, 6/10 in intensity, worse with deep inspiration and when laying flat. Associated with SOB and AKERS over past few days. Reminds him of pericarditis pain he had 30 years ago. No fever, cough, chills, NVD, Abdominal pain, Dysuria, or LE swelling. No Hx of PE/DVT.     In the ER:   Vitals: /74, , RR 18, T 35.6 oral, SpO2 91% RA  EKG: NSR  Sig Labs: WBC 14.46, Hgb 14.7, , Cr 0.8, Trop <0.01, BNP 12, COVID -ve  Imaging:   CXR: Right basilar opacity, elevation of the right hemidiaphragm  CTA: No CTA evidence of acute central pulmonary embolus. Right lower lobe atelectasis.     (25 Mar 2023 10:15)      PAST MEDICAL & SURGICAL HISTORY  Asthmatic bronchitis , chronic    ALE (obstructive sleep apnea)    HTN, goal below 140/80    S/P cataract surgery    H/O total knee replacement, left    H/O total knee replacement, left        FAMILY HISTORY:  FAMILY HISTORY:      SOCIAL HISTORY:  [X]smoker: occasional cigars  [X]Alcohol  []Drug    ALLERGIES:  No Known Allergies      MEDICATIONS:  MEDICATIONS  (STANDING):  aspirin 975 milliGRAM(s) Oral three times a day  budesonide 160 MICROgram(s)/formoterol 4.5 MICROgram(s) Inhaler 2 Puff(s) Inhalation two times a day  cefTRIAXone   IVPB 1000 milliGRAM(s) IV Intermittent every 24 hours  chlorhexidine 4% Liquid 1 Application(s) Topical <User Schedule>  colchicine 0.6 milliGRAM(s) Oral two times a day  enalapril 20 milliGRAM(s) Oral daily  enoxaparin Injectable 40 milliGRAM(s) SubCutaneous every 24 hours  glycopyrrolate 9 MICROgram(s)/formoterol 4.8 MICROgram(s) Inhaler 2 Puff(s) Inhalation two times a day  montelukast 10 milliGRAM(s) Oral daily  pantoprazole    Tablet 40 milliGRAM(s) Oral before breakfast  regadenoson Injectable 0.4 milliGRAM(s) IV Push once  simvastatin 20 milliGRAM(s) Oral at bedtime    MEDICATIONS  (PRN):  acetaminophen     Tablet .. 650 milliGRAM(s) Oral every 6 hours PRN Temp greater or equal to 38C (100.4F), Mild Pain (1 - 3)  albuterol    90 MICROgram(s) HFA Inhaler 2 Puff(s) Inhalation every 6 hours PRN Shortness of Breath  melatonin 3 milliGRAM(s) Oral at bedtime PRN Sleep  ondansetron    Tablet 4 milliGRAM(s) Oral every 8 hours PRN Nausea and/or Vomiting      HOME MEDICATIONS:  Home Medications:      VITALS:   T(F): 98 (03-26 @ 07:56), Max: 98.1 (03-26 @ 05:52)  HR: 77 (03-26 @ 07:56) (73 - 106)  BP: 136/68 (03-26 @ 07:56) (132/66 - 173/81)  BP(mean): 97 (03-26 @ 05:52) (92 - 97)  RR: 17 (03-26 @ 07:56) (17 - 18)  SpO2: 95% (03-26 @ 07:56) (91% - 98%)    I&O's Summary      REVIEW OF SYSTEMS:  CONSTITUTIONAL: No weakness, fevers or chills  EYES: No visual changes  ENT: No vertigo or throat pain   NECK: No pain or stiffness  RESPIRATORY: No cough, wheezing, hemoptysis; No shortness of breath  CARDIOVASCULAR: Chest pain as described in HPI  GASTROINTESTINAL: No abdominal or epigastric pain. No nausea, vomiting, or hematemesis; No diarrhea or constipation. No melena or hematochezia.  GENITOURINARY: No dysuria, frequency or hematuria  NEUROLOGICAL: No numbness or weakness  SKIN: No itching, no rashes  MSK: No pain    PHYSICAL EXAM:  NEURO: patient is awake , alert and oriented  GEN: Not in acute distress  NECK: no thyroid enlargement, no JVD  LUNGS: Clear to auscultation bilaterally   CARDIOVASCULAR: S1/S2 present, RRR  ABD: Soft, non-tender  EXT: No VERONICA  SKIN: Intact    LABS:                        13.5   8.68  )-----------( 273      ( 26 Mar 2023 07:22 )             41.6     03-26    140  |  98  |  17  ----------------------------<  120<H>  4.2   |  30  |  0.9    Ca    9.5      26 Mar 2023 07:22  Phos  4.0     03-26  Mg     2.0     03-26    TPro  6.3  /  Alb  4.1  /  TBili  0.4  /  DBili  x   /  AST  16  /  ALT  18  /  AlkPhos  86  03-26      Sedimentation Rate, Erythrocyte: 42 mm/Hr *H* (03-26-23 @ 07:22)  Troponin T, Serum: <0.01 ng/mL (03-26-23 @ 07:22)  Troponin T, Serum: <0.01 ng/mL (03-25-23 @ 22:30)  Troponin T, Serum: <0.01 ng/mL (03-25-23 @ 11:04)    CARDIAC MARKERS ( 26 Mar 2023 07:22 )  x     / <0.01 ng/mL / x     / x     / x      CARDIAC MARKERS ( 25 Mar 2023 22:30 )  x     / <0.01 ng/mL / x     / x     / x      CARDIAC MARKERS ( 25 Mar 2023 11:04 )  x     / <0.01 ng/mL / x     / x     / x      CARDIAC MARKERS ( 24 Mar 2023 15:57 )  x     / <0.01 ng/mL / x     / x     / x            Troponin trend:      03-26 Chol 152 LDL -- HDL 78 Trig 61      RADIOLOGY:  -CXR:  < from: Xray Chest 1 View- PORTABLE-Urgent (03.24.23 @ 16:06) >  Impression:    Right basilar opacity, elevation of the right hemidiaphragm    < end of copied text >  < from: CT Angio Chest PE Protocol w/ IV Cont (03.24.23 @ 19:34) >    IMPRESSION:    No CTA evidence of acute central pulmonary embolus.    Right lower lobe atelectasis.      < end of copied text >    -TTE:  -CCTA:  -STRESS TEST:  -CATHETERIZATION:    ECG: NSR, MO depressions   HPI:  HPI: 67 M PMHx of ALE, Asthma, [id number], ALE, HTN presents with chest discomfort that started 8am on 03/24/23 and was stable in intensity. Pain localized to middle of the chest, non-radiating, 6/10 in intensity, worse with deep inspiration and when laying flat. Associated with SOB and AKERS over past few days. Reminds him of pericarditis pain he had 30 years ago. No fever, cough, chills, NVD, Abdominal pain, Dysuria, or LE swelling. No Hx of PE/DVT.     In the ER:   Vitals: /74, , RR 18, T 35.6 oral, SpO2 91% RA  EKG: NSR  Sig Labs: WBC 14.46, Hgb 14.7, , Cr 0.8, Trop <0.01, BNP 12, COVID -ve  Imaging:   CXR: Right basilar opacity, elevation of the right hemidiaphragm  CTA: No CTA evidence of acute central pulmonary embolus. Right lower lobe atelectasis.        PAST MEDICAL & SURGICAL HISTORY  Asthmatic bronchitis , chronic    ALE (obstructive sleep apnea)    HTN, goal below 140/80    S/P cataract surgery    H/O total knee replacement, left    H/O total knee replacement, left      No pertinent family history of premature CAD in first degree relatives    SOCIAL HISTORY:  [X]smoker: occasional cigars  [X]Alcohol  []Drug    ALLERGIES:  No Known Allergies      MEDICATIONS:  MEDICATIONS  (STANDING):  aspirin 975 milliGRAM(s) Oral three times a day  budesonide 160 MICROgram(s)/formoterol 4.5 MICROgram(s) Inhaler 2 Puff(s) Inhalation two times a day  cefTRIAXone   IVPB 1000 milliGRAM(s) IV Intermittent every 24 hours  chlorhexidine 4% Liquid 1 Application(s) Topical <User Schedule>  colchicine 0.6 milliGRAM(s) Oral two times a day  enalapril 20 milliGRAM(s) Oral daily  enoxaparin Injectable 40 milliGRAM(s) SubCutaneous every 24 hours  glycopyrrolate 9 MICROgram(s)/formoterol 4.8 MICROgram(s) Inhaler 2 Puff(s) Inhalation two times a day  montelukast 10 milliGRAM(s) Oral daily  pantoprazole    Tablet 40 milliGRAM(s) Oral before breakfast  regadenoson Injectable 0.4 milliGRAM(s) IV Push once  simvastatin 20 milliGRAM(s) Oral at bedtime    MEDICATIONS  (PRN):  acetaminophen     Tablet .. 650 milliGRAM(s) Oral every 6 hours PRN Temp greater or equal to 38C (100.4F), Mild Pain (1 - 3)  albuterol    90 MICROgram(s) HFA Inhaler 2 Puff(s) Inhalation every 6 hours PRN Shortness of Breath  melatonin 3 milliGRAM(s) Oral at bedtime PRN Sleep  ondansetron    Tablet 4 milliGRAM(s) Oral every 8 hours PRN Nausea and/or Vomiting      VITALS:   T(F): 98 (03-26 @ 07:56), Max: 98.1 (03-26 @ 05:52)  HR: 77 (03-26 @ 07:56) (73 - 106)  BP: 136/68 (03-26 @ 07:56) (132/66 - 173/81)  BP(mean): 97 (03-26 @ 05:52) (92 - 97)  RR: 17 (03-26 @ 07:56) (17 - 18)  SpO2: 95% (03-26 @ 07:56) (91% - 98%)      REVIEW OF SYSTEMS:  CONSTITUTIONAL: No fever, weight loss, fatigue  NECK: No pain or stiffness  RESPIRATORY: See HPI  CARDIOVASCULAR: See HPI  GASTROINTESTINAL: No abdominal/epigastric pain, nausea, vomiting, hematemesis, diarrhea, constipation, melena or hematochezia  GENITOURINARY: No dysuria, frequency, hematuria, incontinence  NEUROLOGICAL: No headaches, memory loss, loss of strength, numbness, tremors  SKIN: No itching, burning, rashes, lesions   ENDOCRINE: No heat/cold intolerance or hair loss  MUSCULOSKELETAL: No joint pain or swelling  HEME/LYMPH: No easy bruising or bleeding gums    PHYSICAL EXAM:  PHYSICAL EXAM:  General: NAD, AAOx3  HEENT: NCAT, EOMI  Neck: supple, no JVD  CV: RRR, S1S2 nl, no murmurs, no edema  Respiratory: CTA bilaterally, no wheeze, rales or rhonchi	  Abdomen: soft, NT/ND, +BS  Extremities: warm, ROM nl, 2+ PP bilaterally  Neuro: Nonfocal      LABS:                        13.5   8.68  )-----------( 273      ( 26 Mar 2023 07:22 )             41.6     03-26    140  |  98  |  17  ----------------------------<  120<H>  4.2   |  30  |  0.9    Ca    9.5      26 Mar 2023 07:22  Phos  4.0     03-26  Mg     2.0     03-26    TPro  6.3  /  Alb  4.1  /  TBili  0.4  /  DBili  x   /  AST  16  /  ALT  18  /  AlkPhos  86  03-26      Sedimentation Rate, Erythrocyte: 42 mm/Hr *H* (03-26-23 @ 07:22)    Troponin T, Serum: <0.01 ng/mL (03-26-23 @ 07:22)  Troponin T, Serum: <0.01 ng/mL (03-25-23 @ 22:30)  Troponin T, Serum: <0.01 ng/mL (03-25-23 @ 11:04)        < from: Xray Chest 1 View- PORTABLE-Urgent (03.24.23 @ 16:06) >  Impression:    Right basilar opacity, elevation of the right hemidiaphragm    < end of copied text >          < from: CT Angio Chest PE Protocol w/ IV Cont (03.24.23 @ 19:34) >    IMPRESSION:    No CTA evidence of acute central pulmonary embolus.    Right lower lobe atelectasis.      < end of copied text >      ECG: NSR, DC depressions

## 2023-03-27 ENCOUNTER — TRANSCRIPTION ENCOUNTER (OUTPATIENT)
Age: 68
End: 2023-03-27

## 2023-03-27 VITALS
SYSTOLIC BLOOD PRESSURE: 118 MMHG | RESPIRATION RATE: 18 BRPM | OXYGEN SATURATION: 97 % | TEMPERATURE: 98 F | HEART RATE: 83 BPM | DIASTOLIC BLOOD PRESSURE: 59 MMHG

## 2023-03-27 LAB
A1C WITH ESTIMATED AVERAGE GLUCOSE RESULT: 6.1 % — HIGH (ref 4–5.6)
ALBUMIN SERPL ELPH-MCNC: 4.4 G/DL — SIGNIFICANT CHANGE UP (ref 3.5–5.2)
ALP SERPL-CCNC: 88 U/L — SIGNIFICANT CHANGE UP (ref 30–115)
ALT FLD-CCNC: 20 U/L — SIGNIFICANT CHANGE UP (ref 0–41)
ANION GAP SERPL CALC-SCNC: 12 MMOL/L — SIGNIFICANT CHANGE UP (ref 7–14)
AST SERPL-CCNC: 16 U/L — SIGNIFICANT CHANGE UP (ref 0–41)
B BURGDOR C6 AB SER-ACNC: NEGATIVE — SIGNIFICANT CHANGE UP
B BURGDOR IGG+IGM SER-ACNC: 0.03 INDEX — SIGNIFICANT CHANGE UP (ref 0.01–0.89)
BASOPHILS # BLD AUTO: 0.02 K/UL — SIGNIFICANT CHANGE UP (ref 0–0.2)
BASOPHILS NFR BLD AUTO: 0.2 % — SIGNIFICANT CHANGE UP (ref 0–1)
BILIRUB SERPL-MCNC: 0.4 MG/DL — SIGNIFICANT CHANGE UP (ref 0.2–1.2)
BUN SERPL-MCNC: 18 MG/DL — SIGNIFICANT CHANGE UP (ref 10–20)
CALCIUM SERPL-MCNC: 9.5 MG/DL — SIGNIFICANT CHANGE UP (ref 8.4–10.5)
CHLORIDE SERPL-SCNC: 103 MMOL/L — SIGNIFICANT CHANGE UP (ref 98–110)
CO2 SERPL-SCNC: 29 MMOL/L — SIGNIFICANT CHANGE UP (ref 17–32)
CREAT SERPL-MCNC: 0.9 MG/DL — SIGNIFICANT CHANGE UP (ref 0.7–1.5)
EGFR: 93 ML/MIN/1.73M2 — SIGNIFICANT CHANGE UP
EOSINOPHIL # BLD AUTO: 0.2 K/UL — SIGNIFICANT CHANGE UP (ref 0–0.7)
EOSINOPHIL NFR BLD AUTO: 2.1 % — SIGNIFICANT CHANGE UP (ref 0–8)
ESTIMATED AVERAGE GLUCOSE: 128 MG/DL — HIGH (ref 68–114)
GLUCOSE SERPL-MCNC: 106 MG/DL — HIGH (ref 70–99)
HCT VFR BLD CALC: 44.3 % — SIGNIFICANT CHANGE UP (ref 42–52)
HCV AB S/CO SERPL IA: 0.07 COI — SIGNIFICANT CHANGE UP
HCV AB SERPL-IMP: SIGNIFICANT CHANGE UP
HGB BLD-MCNC: 13.9 G/DL — LOW (ref 14–18)
IMM GRANULOCYTES NFR BLD AUTO: 0.3 % — SIGNIFICANT CHANGE UP (ref 0.1–0.3)
LYMPHOCYTES # BLD AUTO: 1.45 K/UL — SIGNIFICANT CHANGE UP (ref 1.2–3.4)
LYMPHOCYTES # BLD AUTO: 15.5 % — LOW (ref 20.5–51.1)
MAGNESIUM SERPL-MCNC: 2.2 MG/DL — SIGNIFICANT CHANGE UP (ref 1.8–2.4)
MCHC RBC-ENTMCNC: 31.4 G/DL — LOW (ref 32–37)
MCHC RBC-ENTMCNC: 31.8 PG — HIGH (ref 27–31)
MCV RBC AUTO: 101.4 FL — HIGH (ref 80–94)
MONOCYTES # BLD AUTO: 0.79 K/UL — HIGH (ref 0.1–0.6)
MONOCYTES NFR BLD AUTO: 8.5 % — SIGNIFICANT CHANGE UP (ref 1.7–9.3)
NEUTROPHILS # BLD AUTO: 6.84 K/UL — HIGH (ref 1.4–6.5)
NEUTROPHILS NFR BLD AUTO: 73.4 % — SIGNIFICANT CHANGE UP (ref 42.2–75.2)
NRBC # BLD: 0 /100 WBCS — SIGNIFICANT CHANGE UP (ref 0–0)
PHOSPHATE SERPL-MCNC: 4.4 MG/DL — SIGNIFICANT CHANGE UP (ref 2.1–4.9)
PLATELET # BLD AUTO: 307 K/UL — SIGNIFICANT CHANGE UP (ref 130–400)
POTASSIUM SERPL-MCNC: 4.9 MMOL/L — SIGNIFICANT CHANGE UP (ref 3.5–5)
POTASSIUM SERPL-SCNC: 4.9 MMOL/L — SIGNIFICANT CHANGE UP (ref 3.5–5)
PROCALCITONIN SERPL-MCNC: 0.09 NG/ML — SIGNIFICANT CHANGE UP (ref 0.02–0.1)
PROT SERPL-MCNC: 6.4 G/DL — SIGNIFICANT CHANGE UP (ref 6–8)
RBC # BLD: 4.37 M/UL — LOW (ref 4.7–6.1)
RBC # FLD: 13.3 % — SIGNIFICANT CHANGE UP (ref 11.5–14.5)
SODIUM SERPL-SCNC: 144 MMOL/L — SIGNIFICANT CHANGE UP (ref 135–146)
TSH SERPL-MCNC: 1.25 UIU/ML — SIGNIFICANT CHANGE UP (ref 0.27–4.2)
WBC # BLD: 9.33 K/UL — SIGNIFICANT CHANGE UP (ref 4.8–10.8)
WBC # FLD AUTO: 9.33 K/UL — SIGNIFICANT CHANGE UP (ref 4.8–10.8)

## 2023-03-27 PROCEDURE — 99239 HOSP IP/OBS DSCHRG MGMT >30: CPT

## 2023-03-27 PROCEDURE — 71045 X-RAY EXAM CHEST 1 VIEW: CPT | Mod: 26

## 2023-03-27 RX ORDER — ALBUTEROL 90 UG/1
2 AEROSOL, METERED ORAL
Qty: 0 | Refills: 0 | DISCHARGE

## 2023-03-27 RX ORDER — INDAPAMIDE 1.25 MG
1 TABLET ORAL
Qty: 0 | Refills: 0 | DISCHARGE

## 2023-03-27 RX ORDER — TRANDOLAPRIL 2 MG
1 TABLET ORAL
Qty: 0 | Refills: 0 | DISCHARGE

## 2023-03-27 RX ORDER — SIMVASTATIN 20 MG/1
1 TABLET, FILM COATED ORAL
Qty: 0 | Refills: 0 | DISCHARGE

## 2023-03-27 RX ORDER — BUDESONIDE, GLYCOPYRROLATE, AND FORMOTEROL FUMARATE 160; 9; 4.8 UG/1; UG/1; UG/1
2 AEROSOL, METERED RESPIRATORY (INHALATION)
Qty: 0 | Refills: 0 | DISCHARGE

## 2023-03-27 RX ORDER — MONTELUKAST 4 MG/1
1 TABLET, CHEWABLE ORAL
Qty: 0 | Refills: 0 | DISCHARGE
Start: 2023-03-27

## 2023-03-27 RX ADMIN — Medication 20 MILLIGRAM(S): at 05:34

## 2023-03-27 RX ADMIN — Medication 975 MILLIGRAM(S): at 05:34

## 2023-03-27 RX ADMIN — MONTELUKAST 10 MILLIGRAM(S): 4 TABLET, CHEWABLE ORAL at 12:07

## 2023-03-27 RX ADMIN — Medication 0.6 MILLIGRAM(S): at 05:34

## 2023-03-27 RX ADMIN — PANTOPRAZOLE SODIUM 40 MILLIGRAM(S): 20 TABLET, DELAYED RELEASE ORAL at 05:34

## 2023-03-27 RX ADMIN — CEFTRIAXONE 100 MILLIGRAM(S): 500 INJECTION, POWDER, FOR SOLUTION INTRAMUSCULAR; INTRAVENOUS at 05:44

## 2023-03-27 RX ADMIN — BUDESONIDE AND FORMOTEROL FUMARATE DIHYDRATE 2 PUFF(S): 160; 4.5 AEROSOL RESPIRATORY (INHALATION) at 08:45

## 2023-03-27 NOTE — DISCHARGE NOTE NURSING/CASE MANAGEMENT/SOCIAL WORK - PATIENT PORTAL LINK FT
You can access the FollowMyHealth Patient Portal offered by [facility] by registering at the following website: [email or web address]. By joining [employer]’s FollowMyHealth portal, you will also be able to view your health information using other applications (apps) compatible with our system.

## 2023-03-27 NOTE — PROGRESS NOTE ADULT - SUBJECTIVE AND OBJECTIVE BOX
SOBEIDA MONTANA  67y  Male      Patient is a 67y old  Male who presents with a chief complaint of Chest Discomfort.      INTERVAL HPI/OVERNIGHT EVENTS:      ******************************* REVIEW OF SYSTEMS:**********************************************    All other review of systems negative    *********************** VITALS ******************************************    T(F): 98 (03-26-23 @ 07:56)  HR: 77 (03-26-23 @ 07:56) (73 - 93)  BP: 136/68 (03-26-23 @ 07:56) (132/66 - 144/71)  RR: 17 (03-26-23 @ 07:56) (17 - 18)  SpO2: 95% (03-26-23 @ 07:56) (93% - 98%)            ******************************** PHYSICAL EXAM:**************************************************  GENERAL: NAD    PSYCH: no agitation, baseline mentation  HEENT:     NERVOUS SYSTEM:  Alert & Oriented X3,     PULMONARY: HARMEET, CTA    CARDIOVASCULAR: S1S2 RRR    GI: Soft, NT, ND; BS present.    EXTREMITIES:  2+ Peripheral Pulses, No clubbing, cyanosis, or edema    LYMPH: No lymphadenopathy noted    SKIN: No rashes or lesions      **************************** LABS *******************************************************                          13.5   8.68  )-----------( 273      ( 26 Mar 2023 07:22 )             41.6     03-26    140  |  98  |  17  ----------------------------<  120<H>  4.2   |  30  |  0.9    Ca    9.5      26 Mar 2023 07:22  Phos  4.0     03-26  Mg     2.0     03-26    TPro  6.3  /  Alb  4.1  /  TBili  0.4  /  DBili  x   /  AST  16  /  ALT  18  /  AlkPhos  86  03-26          Lactate Trend    CARDIAC MARKERS ( 26 Mar 2023 07:22 )  x     / <0.01 ng/mL / x     / x     / x      CARDIAC MARKERS ( 25 Mar 2023 22:30 )  x     / <0.01 ng/mL / x     / x     / x      CARDIAC MARKERS ( 25 Mar 2023 11:04 )  x     / <0.01 ng/mL / x     / x     / x      CARDIAC MARKERS ( 24 Mar 2023 15:57 )  x     / <0.01 ng/mL / x     / x     / x          CAPILLARY BLOOD GLUCOSE      POCT Blood Glucose.: 119 mg/dL (25 Mar 2023 21:08)          **************************Active Medications *******************************************  No Known Allergies      acetaminophen     Tablet .. 650 milliGRAM(s) Oral every 6 hours PRN  albuterol    90 MICROgram(s) HFA Inhaler 2 Puff(s) Inhalation every 6 hours PRN  aspirin 975 milliGRAM(s) Oral three times a day  budesonide 160 MICROgram(s)/formoterol 4.5 MICROgram(s) Inhaler 2 Puff(s) Inhalation two times a day  cefTRIAXone   IVPB 1000 milliGRAM(s) IV Intermittent every 24 hours  chlorhexidine 4% Liquid 1 Application(s) Topical <User Schedule>  colchicine 0.6 milliGRAM(s) Oral two times a day  enalapril 20 milliGRAM(s) Oral daily  enoxaparin Injectable 40 milliGRAM(s) SubCutaneous every 24 hours  melatonin 3 milliGRAM(s) Oral at bedtime PRN  montelukast 10 milliGRAM(s) Oral daily  ondansetron    Tablet 4 milliGRAM(s) Oral every 8 hours PRN  pantoprazole    Tablet 40 milliGRAM(s) Oral before breakfast  regadenoson Injectable 0.4 milliGRAM(s) IV Push once  simvastatin 20 milliGRAM(s) Oral at bedtime      ***************************************************  RADIOLOGY & ADDITIONAL TESTS:    Imaging Personally Reviewed:  [ ] YES  [ ] NO    HEALTH ISSUES - PROBLEM Dx:      
SUBJECTIVE:    Patient is a 67y old Male who presents with a chief complaint of Chest Discomfort (26 Mar 2023 12:33)    Currently admitted to medicine with the primary diagnosis of Chest pain       Today is hospital day 3d.     PAST MEDICAL & SURGICAL HISTORY  Asthmatic bronchitis , chronic    ALE (obstructive sleep apnea)    HTN, goal below 140/80    S/P cataract surgery    H/O total knee replacement, left    H/O total knee replacement, left      ALLERGIES:  No Known Allergies    MEDICATIONS:  STANDING MEDICATIONS  aspirin 975 milliGRAM(s) Oral three times a day  budesonide 160 MICROgram(s)/formoterol 4.5 MICROgram(s) Inhaler 2 Puff(s) Inhalation two times a day  cefTRIAXone   IVPB 1000 milliGRAM(s) IV Intermittent every 24 hours  chlorhexidine 4% Liquid 1 Application(s) Topical <User Schedule>  colchicine 0.6 milliGRAM(s) Oral two times a day  enalapril 20 milliGRAM(s) Oral daily  enoxaparin Injectable 40 milliGRAM(s) SubCutaneous every 24 hours  montelukast 10 milliGRAM(s) Oral daily  pantoprazole    Tablet 40 milliGRAM(s) Oral before breakfast  simvastatin 20 milliGRAM(s) Oral at bedtime    PRN MEDICATIONS  acetaminophen     Tablet .. 650 milliGRAM(s) Oral every 6 hours PRN  albuterol    90 MICROgram(s) HFA Inhaler 2 Puff(s) Inhalation every 6 hours PRN  melatonin 3 milliGRAM(s) Oral at bedtime PRN  ondansetron    Tablet 4 milliGRAM(s) Oral every 8 hours PRN    VITALS:   T(F): 98.3  HR: 83  BP: 118/59  RR: 18  SpO2: 97%    LABS:                        13.9   9.33  )-----------( 307      ( 27 Mar 2023 07:44 )             44.3     03-27    144  |  103  |  18  ----------------------------<  106<H>  4.9   |  29  |  0.9    Ca    9.5      27 Mar 2023 07:44  Phos  4.4     03-27  Mg     2.2     03-27    TPro  6.4  /  Alb  4.4  /  TBili  0.4  /  DBili  x   /  AST  16  /  ALT  20  /  AlkPhos  88  03-27              CARDIAC MARKERS ( 26 Mar 2023 07:22 )  x     / <0.01 ng/mL / x     / x     / x      CARDIAC MARKERS ( 25 Mar 2023 22:30 )  x     / <0.01 ng/mL / x     / x     / x      CARDIAC MARKERS ( 25 Mar 2023 11:04 )  x     / <0.01 ng/mL / x     / x     / x          RADIOLOGY:    PHYSICAL EXAM:  GEN: No acute distress  LUNGS: Clear to auscultation bilaterally   HEART: S1/S2 present. RRR.   ABD/ GI: Soft, non-tender, non-distended. Bowel sounds present  EXT: NC/NC/NE/2+PP/MCCOY  NEURO: AAOX3

## 2023-03-27 NOTE — DISCHARGE NOTE PROVIDER - CARE PROVIDER_API CALL
CELINE GALARZA  [facility]  [address] [place] [address]  Phone: [phone]  Fax: [phone]  Follow Up Time: 1 week

## 2023-03-27 NOTE — PROGRESS NOTE ADULT - ASSESSMENT
I: 67 M PMHx of ALE, Asthma, [id number], ALE, HTN presents with chest discomfort that started 8am on 03/24/23 and was stable in intensity. Pain localized to middle of the chest, non-radiating, 6/10 in intensity, worse with deep inspiration and when laying flat. Associated with SOB and AKERS over past few days. Reminds him of pericarditis pain he had 30 years ago. No fever, cough, chills, NVD, Abdominal pain, Dysuria, or LE swelling. No Hx of PE/DVT.     Imaging:   CXR: Right basilar opacity, elevation of the right hemidiaphragm  CTA: No CTA evidence of acute central pulmonary embolus. Right lower lobe atelectasis.    # Chest discomfort with SOB/AKERS     NST--< from: NM Nuclear Stress Pharmacologic Multiple (03.26.23 @ 11:13) >  . NO EVIDENCE FOR ISCHEMIA DURING LEXISCAN INFUSION.  2. NORMAL RESTING LEFT VENTRICULAR WALL MOTION AND WALL THICKENING.  3. LEFT VENTRICULAR EJECTION FRACTION OF  73 % WHICH IS WITHIN RANGE OF   NORMAL.      # RLL PNA vs Atelectasis   - Patient clinically and hemodynamically stable on admission   -elevated left hemidiaphragm noted on repeat CXR and is old known to patient-- no   - - ESR 42  CRP  108 - - < from: TTE Echo Complete w/ Contrast w/ Doppler (03.26.23 @ 08:51) >  Endocardial visualization was enhanced with intravenous echo contrast.   2. Left ventricular ejection fraction, by visual estimation, is 70 to   75%.   3. Hyperdynamic global left ventricular systolic function.      - Aspirin 0.975g TID and Colchicine 0.6 BID-- can be stopped as no pericarditis is noted   - Pantoprazole 40 for GI protection       # ALE  - Home CPAP Machine     # HTN  - Home trendalapril converted to enalapril 20 QD    # Asthma/Allergies   - C/w home inhalers (converted to local availability)      patient has few tests in lab that can be followed out patient. Dc home today-- spent more than 30mins
HPI: 67 M PMHx of ALE, Asthma, [id number], ALE, HTN presents with chest discomfort that started 8am on 03/24/23 and was stable in intensity. Pain localized to middle of the chest, non-radiating, 6/10 in intensity, worse with deep inspiration and when laying flat. Associated with SOB and AKERS over past few days. Reminds him of pericarditis pain he had 30 years ago. No fever, cough, chills, NVD, Abdominal pain, Dysuria, or LE swelling. No Hx of PE/DVT.     Imaging:   CXR: Right basilar opacity, elevation of the right hemidiaphragm  CTA: No CTA evidence of acute central pulmonary embolus. Right lower lobe atelectasis.    # Chest discomfort with SOB/AKERS      r/o ACS      f/u Lexiscan     # RLL PNA vs Atelectasis   - Patient clinically and hemodynamically stable on admission   - Saturating well on RA, Mild Tachycardia   - Chest discomfort compatible with pericarditis   - WBC 14.46, , Trop <0.01 (trend), BNP 12, COVID -ve, Send RVP  - Tele monitoring, Admit to tele   - ESR 42  CRP  108   LDH sent  - Procal sent, Sputum Cx, BCx, Urine Strep + Legionella, Lyme serology   - Cardiology Consulted   - TTE ordered, Stress test ordered   - Aspirin 0.975g TID and Colchicine 0.6 BID   - Pantoprazole 40 for GI protection   - Would Treat for RLL PNA, Started Ceftriaxone 1g QD (if procal and cultures -ve DC)  - Risk stratification A1C, Lipid Profile, TSH in AM     # ALE  - Home CPAP Machine     # HTN  - Home trendalapril converted to enalapril 20 QD    # Asthma/Allergies   - C/w home inhalers (converted to local availability)     # DLD  - Dash Diet  - C/w home simvastatin 20     DVT PPX: Lovenox  GI PPX: Pantoprazole   Diet: DASH  Activity: IAT  Code: Full    Pending : Lexiscan/ IV Abx / Cultures   Dispo: Home

## 2023-03-27 NOTE — DISCHARGE NOTE PROVIDER - HOSPITAL COURSE
67 M PMHx of ALE, Asthma, [id number], ALE, HTN presents with chest discomfort that started 8am on 03/24/23 and was stable in intensity. Pain localized to middle of the chest, non-radiating, 6/10 in intensity, worse with deep inspiration and when laying flat. Associated with SOB and AKERS over past few days.     During the hospitalization the patient underwent the following investigation and no definitive cause was found.   CXR: Right basilar opacity, elevation of the right hemidiaphragm with atelectasis  CTA: No CTA evidence of acute central pulmonary embolus. Right lower lobe atelectasis.  ECHO 73% EF and hyperdynamic LVSF, troponin were negative  HE is having atelectasis of the right lower lobe, repeat x ray showed the same findings. The patient was advised regarding aggressive pulmonary toilet and use of CPAP at home

## 2023-03-27 NOTE — DISCHARGE NOTE PROVIDER - NSDCFUADDAPPT_GEN_ALL_CORE_FT
Please schedule an appointment with your Cardiologist and follow up outpatient for furthur work up. Please schedule an appointment with your Cardiologist and follow up outpatient for furthur work up.  repeat CRP levels in 1 week and follow results with your PMD or cardiologist

## 2023-03-27 NOTE — DISCHARGE NOTE PROVIDER - NSDCCPCAREPLAN_GEN_ALL_CORE_FT
PRINCIPAL DISCHARGE DIAGNOSIS  Diagnosis: Chest pain  Assessment and Plan of Treatment: Chest pain can be caused by many different conditions which may or may not be dangerous. Causes include heartburn, lung infections, heart attack, blood clot in lungs, skin infections, strain or damage to muscle, cartilage, or bones, etc. In addition to a history and physical examination, an electrocardiogram (ECG) or other lab tests may have been performed to determine the cause of your chest pain. Follow up with your primary care provider or with a cardiologist as instructed.   We did Nuclear scan to rule out ischemic heart disease which normal EF of 73% and no ischemia was found. Please f/u with your Cardiologist regularly. We also did ECHO and it showed hyperdynamic LVSF but nothing acutely concering. Please take your cardio medications regularly.  SEEK IMMEDIATE MEDICAL CARE IF YOU HAVE ANY OF THE FOLLOWING SYMPTOMS: worsening chest pain, coughing up blood, unexplained back/neck/jaw pain, severe abdominal pain, dizziness or lightheadedness, fainting, shortness of breath, sweaty or clammy skin, vomiting, or racing heart beat. These symptoms may represent a serious problem that is an emergency. Do not wait to see if the symptoms will go away. Get medical help right away. Call 911 and do not drive yourself to the hospital.  '        SECONDARY DISCHARGE DIAGNOSES  Diagnosis: Shortness of breath  Assessment and Plan of Treatment:     Diagnosis: Hypoxia  Assessment and Plan of Treatment:

## 2023-03-27 NOTE — DISCHARGE NOTE PROVIDER - NSDCMRMEDTOKEN_GEN_ALL_CORE_FT
Albuterol (Eqv-ProAir HFA) 90 mcg/inh inhalation aerosol: 2 puff(s) inhaled 4 times a day  Breztri Aerosphere inhalation aerosol: 2 puff(s) inhaled 4 times a day  indapamide 1.25 mg oral tablet: 1 tab(s) orally  montelukast 10 mg oral tablet: 1 tab(s) orally once a day  simvastatin 20 mg oral tablet: 1 tab(s) orally once a day  trandolapril 4 mg oral tablet: 1 tab(s) orally once a day   Albuterol (Eqv-ProAir HFA) 90 mcg/inh inhalation aerosol: 2 puff(s) inhaled 4 times a day  Breztri Aerosphere inhalation aerosol: 2 puff(s) inhaled 4 times a day  indapamide 1.25 mg oral tablet: 1 tab(s) orally once a day  montelukast 10 mg oral tablet: 1 tab(s) orally once a day  simvastatin 20 mg oral tablet: 1 tab(s) orally once a day  trandolapril 4 mg oral tablet: 1 tab(s) orally once a day

## 2023-03-27 NOTE — DISCHARGE NOTE NURSING/CASE MANAGEMENT/SOCIAL WORK - NSDCFUADDAPPT_GEN_ALL_CORE_FT
Please schedule an appointment with your Cardiologist and follow up outpatient for furthur work up.  repeat CRP levels in 1 week and follow results with your PMD or cardiologist

## 2023-03-28 ENCOUNTER — TRANSCRIPTION ENCOUNTER (OUTPATIENT)
Age: 68
End: 2023-03-28

## 2023-03-30 DIAGNOSIS — Z96.653 PRESENCE OF ARTIFICIAL KNEE JOINT, BILATERAL: ICD-10-CM

## 2023-03-30 DIAGNOSIS — E78.5 HYPERLIPIDEMIA, UNSPECIFIED: ICD-10-CM

## 2023-03-30 DIAGNOSIS — Z20.822 CONTACT WITH AND (SUSPECTED) EXPOSURE TO COVID-19: ICD-10-CM

## 2023-03-30 DIAGNOSIS — I10 ESSENTIAL (PRIMARY) HYPERTENSION: ICD-10-CM

## 2023-03-30 DIAGNOSIS — G47.33 OBSTRUCTIVE SLEEP APNEA (ADULT) (PEDIATRIC): ICD-10-CM

## 2023-03-30 DIAGNOSIS — J98.11 ATELECTASIS: ICD-10-CM

## 2023-03-30 DIAGNOSIS — Z79.51 LONG TERM (CURRENT) USE OF INHALED STEROIDS: ICD-10-CM

## 2023-03-30 DIAGNOSIS — R07.9 CHEST PAIN, UNSPECIFIED: ICD-10-CM

## 2023-03-30 DIAGNOSIS — J42 UNSPECIFIED CHRONIC BRONCHITIS: ICD-10-CM

## 2023-03-31 LAB
CULTURE RESULTS: SIGNIFICANT CHANGE UP
SPECIMEN SOURCE: SIGNIFICANT CHANGE UP

## 2023-04-19 ENCOUNTER — APPOINTMENT (OUTPATIENT)
Dept: ORTHOPEDIC SURGERY | Facility: CLINIC | Age: 68
End: 2023-04-19
Payer: MEDICARE

## 2023-04-19 VITALS — BODY MASS INDEX: 42.44 KG/M2 | WEIGHT: 280 LBS | HEIGHT: 68 IN

## 2023-04-19 DIAGNOSIS — M70.61 TROCHANTERIC BURSITIS, RIGHT HIP: ICD-10-CM

## 2023-04-19 PROBLEM — I10 ESSENTIAL (PRIMARY) HYPERTENSION: Chronic | Status: ACTIVE | Noted: 2023-03-25

## 2023-04-19 PROBLEM — G47.33 OBSTRUCTIVE SLEEP APNEA (ADULT) (PEDIATRIC): Chronic | Status: ACTIVE | Noted: 2023-03-25

## 2023-04-19 PROBLEM — J44.9 CHRONIC OBSTRUCTIVE PULMONARY DISEASE, UNSPECIFIED: Chronic | Status: ACTIVE | Noted: 2023-03-25

## 2023-04-19 PROCEDURE — 20610 DRAIN/INJ JOINT/BURSA W/O US: CPT | Mod: RT

## 2023-04-19 PROCEDURE — 73503 X-RAY EXAM HIP UNI 4/> VIEWS: CPT | Mod: RT

## 2023-04-19 PROCEDURE — 99203 OFFICE O/P NEW LOW 30 MIN: CPT | Mod: 25

## 2023-04-19 NOTE — HISTORY OF PRESENT ILLNESS
[de-identified] : 67-year-old male here for evaluation of right hip pain, patient states that the pain started about a week ago, at this time is getting worse the pain is 9/10 with ambulation, pain 5/10 at rest.\par Patient states that he been favoring his right hip so he is developing pain on the hamstring esophagus left lower extremity.\par \par Patient denies any trauma or any injury.\par \par Patient gives a history of bilateral knee replacement.\par \par Past medical history hypertension and hypercholesterolemia and asthma.\par Patient denies any allergies to medications.

## 2023-04-19 NOTE — DISCUSSION/SUMMARY
[de-identified] : Impression: Right trochanteric bursitis.\par \par Plan: She was advised for a cortisone injection, patient agrees.\par Injection to the trochanteric bursa was given using 5 mL of lidocaine 1% and 1 mL of dexamethasone 10 mg.\par This injection was done under sterile technique.\par Patient tolerated well.\par Patient was advised to do activities as tolerated, prescription for anti-inflammatories will be sent to the pharmacy.\par Patient was advised to stop taking over-the-counter anti-inflammatories for pain when he takes a prescription strength anti-inflammatory.\par Patient was offered a prescription for physical therapy for the right hip, patient kindly declined.\par Patient was advised to follow-up if needed.\par \par Follow-up: As needed\par \par Supervising physician Dr. Tejada

## 2023-04-19 NOTE — IMAGING
[de-identified] : On examination of the right hip, patient is obese, patient has mild antalgic gait with ambulation due to pain to the right hip.\par Patient has significant tenderness on palpating over the trochanteric bursa.\par Good motor strength to hip flexion.\par Negative logrolling.\par \par X-ray of the right hip and pelvis is negative for any acute fracture or dislocation, no significant degenerative changes over the hip joint but large calcification seen over the trochanteric bursa.

## 2023-05-17 ENCOUNTER — RX RENEWAL (OUTPATIENT)
Age: 68
End: 2023-05-17

## 2023-05-17 RX ORDER — DICLOFENAC SODIUM 75 MG/1
75 TABLET, DELAYED RELEASE ORAL
Qty: 60 | Refills: 0 | Status: ACTIVE | COMMUNITY
Start: 2023-04-19 | End: 1900-01-01

## 2024-02-21 ENCOUNTER — RX RENEWAL (OUTPATIENT)
Age: 69
End: 2024-02-21

## 2024-03-07 ENCOUNTER — RX RENEWAL (OUTPATIENT)
Age: 69
End: 2024-03-07
